# Patient Record
Sex: FEMALE | Race: WHITE | NOT HISPANIC OR LATINO | Employment: FULL TIME | URBAN - NONMETROPOLITAN AREA
[De-identification: names, ages, dates, MRNs, and addresses within clinical notes are randomized per-mention and may not be internally consistent; named-entity substitution may affect disease eponyms.]

---

## 2024-01-23 ENCOUNTER — APPOINTMENT (OUTPATIENT)
Dept: RADIOLOGY | Facility: HOSPITAL | Age: 34
End: 2024-01-23

## 2024-01-23 ENCOUNTER — HOSPITAL ENCOUNTER (EMERGENCY)
Facility: HOSPITAL | Age: 34
Discharge: HOME | End: 2024-01-23
Attending: EMERGENCY MEDICINE

## 2024-01-23 VITALS
HEART RATE: 58 BPM | HEIGHT: 62 IN | RESPIRATION RATE: 18 BRPM | SYSTOLIC BLOOD PRESSURE: 152 MMHG | WEIGHT: 280 LBS | BODY MASS INDEX: 51.53 KG/M2 | DIASTOLIC BLOOD PRESSURE: 72 MMHG | TEMPERATURE: 97.8 F | OXYGEN SATURATION: 95 %

## 2024-01-23 DIAGNOSIS — K92.1 BLOOD IN THE STOOL: Primary | ICD-10-CM

## 2024-01-23 LAB
ALBUMIN SERPL BCP-MCNC: 4 G/DL (ref 3.4–5)
ALP SERPL-CCNC: 67 U/L (ref 33–110)
ALT SERPL W P-5'-P-CCNC: 37 U/L (ref 7–45)
ANION GAP SERPL CALC-SCNC: 10 MMOL/L (ref 10–20)
APPEARANCE UR: CLEAR
AST SERPL W P-5'-P-CCNC: 24 U/L (ref 9–39)
BASOPHILS # BLD AUTO: 0.07 X10*3/UL (ref 0–0.1)
BASOPHILS NFR BLD AUTO: 0.4 %
BILIRUB DIRECT SERPL-MCNC: 0 MG/DL (ref 0–0.3)
BILIRUB SERPL-MCNC: 0.3 MG/DL (ref 0–1.2)
BILIRUB UR STRIP.AUTO-MCNC: NEGATIVE MG/DL
BUN SERPL-MCNC: 15 MG/DL (ref 6–23)
CALCIUM SERPL-MCNC: 9.4 MG/DL (ref 8.6–10.3)
CHLORIDE SERPL-SCNC: 106 MMOL/L (ref 98–107)
CO2 SERPL-SCNC: 27 MMOL/L (ref 21–32)
COLOR UR: YELLOW
CREAT SERPL-MCNC: 0.82 MG/DL (ref 0.5–1.05)
CRP SERPL-MCNC: 1.17 MG/DL
EGFRCR SERPLBLD CKD-EPI 2021: >90 ML/MIN/1.73M*2
EOSINOPHIL # BLD AUTO: 0.17 X10*3/UL (ref 0–0.7)
EOSINOPHIL NFR BLD AUTO: 1 %
ERYTHROCYTE [DISTWIDTH] IN BLOOD BY AUTOMATED COUNT: 14.9 % (ref 11.5–14.5)
ERYTHROCYTE [SEDIMENTATION RATE] IN BLOOD BY WESTERGREN METHOD: 31 MM/H (ref 0–20)
GIANT PLATELETS BLD QL SMEAR: NORMAL
GLUCOSE SERPL-MCNC: 121 MG/DL (ref 74–99)
GLUCOSE UR STRIP.AUTO-MCNC: NEGATIVE MG/DL
HCG UR QL IA.RAPID: NEGATIVE
HCT VFR BLD AUTO: 45.9 % (ref 36–46)
HEMOCCULT SP1 STL QL: NEGATIVE
HGB BLD-MCNC: 15.1 G/DL (ref 12–16)
HOLD SPECIMEN: NORMAL
HOLD SPECIMEN: NORMAL
IMM GRANULOCYTES # BLD AUTO: 0.05 X10*3/UL (ref 0–0.7)
IMM GRANULOCYTES NFR BLD AUTO: 0.3 % (ref 0–0.9)
KETONES UR STRIP.AUTO-MCNC: NEGATIVE MG/DL
LACTATE SERPL-SCNC: 1.5 MMOL/L (ref 0.4–2)
LEUKOCYTE ESTERASE UR QL STRIP.AUTO: NEGATIVE
LIPASE SERPL-CCNC: 32 U/L (ref 9–82)
LYMPHOCYTES # BLD AUTO: 5.91 X10*3/UL (ref 1.2–4.8)
LYMPHOCYTES NFR BLD AUTO: 33.9 %
MAGNESIUM SERPL-MCNC: 1.77 MG/DL (ref 1.6–2.4)
MCH RBC QN AUTO: 29.8 PG (ref 26–34)
MCHC RBC AUTO-ENTMCNC: 32.9 G/DL (ref 32–36)
MCV RBC AUTO: 91 FL (ref 80–100)
MONOCYTES # BLD AUTO: 1.16 X10*3/UL (ref 0.1–1)
MONOCYTES NFR BLD AUTO: 6.6 %
NEUTROPHILS # BLD AUTO: 10.09 X10*3/UL (ref 1.2–7.7)
NEUTROPHILS NFR BLD AUTO: 57.8 %
NITRITE UR QL STRIP.AUTO: NEGATIVE
NRBC BLD-RTO: 0 /100 WBCS (ref 0–0)
PH UR STRIP.AUTO: 6 [PH]
PLATELET # BLD AUTO: 367 X10*3/UL (ref 150–450)
POTASSIUM SERPL-SCNC: 3.9 MMOL/L (ref 3.5–5.3)
PROT SERPL-MCNC: 7.1 G/DL (ref 6.4–8.2)
PROT UR STRIP.AUTO-MCNC: NEGATIVE MG/DL
RBC # BLD AUTO: 5.07 X10*6/UL (ref 4–5.2)
RBC # UR STRIP.AUTO: ABNORMAL /UL
RBC #/AREA URNS AUTO: NORMAL /HPF
RBC MORPH BLD: NORMAL
SODIUM SERPL-SCNC: 139 MMOL/L (ref 136–145)
SP GR UR STRIP.AUTO: 1.02
SQUAMOUS #/AREA URNS AUTO: NORMAL /HPF
UROBILINOGEN UR STRIP.AUTO-MCNC: <2 MG/DL
WBC # BLD AUTO: 17.5 X10*3/UL (ref 4.4–11.3)
WBC #/AREA URNS AUTO: NORMAL /HPF

## 2024-01-23 PROCEDURE — 36415 COLL VENOUS BLD VENIPUNCTURE: CPT | Performed by: EMERGENCY MEDICINE

## 2024-01-23 PROCEDURE — 99284 EMERGENCY DEPT VISIT MOD MDM: CPT | Mod: 25

## 2024-01-23 PROCEDURE — 83690 ASSAY OF LIPASE: CPT | Performed by: EMERGENCY MEDICINE

## 2024-01-23 PROCEDURE — 81001 URINALYSIS AUTO W/SCOPE: CPT | Performed by: EMERGENCY MEDICINE

## 2024-01-23 PROCEDURE — 82248 BILIRUBIN DIRECT: CPT | Performed by: EMERGENCY MEDICINE

## 2024-01-23 PROCEDURE — 85025 COMPLETE CBC W/AUTO DIFF WBC: CPT | Performed by: EMERGENCY MEDICINE

## 2024-01-23 PROCEDURE — 81025 URINE PREGNANCY TEST: CPT | Performed by: EMERGENCY MEDICINE

## 2024-01-23 PROCEDURE — 86140 C-REACTIVE PROTEIN: CPT | Performed by: EMERGENCY MEDICINE

## 2024-01-23 PROCEDURE — 85652 RBC SED RATE AUTOMATED: CPT | Performed by: EMERGENCY MEDICINE

## 2024-01-23 PROCEDURE — 2550000001 HC RX 255 CONTRASTS: Performed by: EMERGENCY MEDICINE

## 2024-01-23 PROCEDURE — 74177 CT ABD & PELVIS W/CONTRAST: CPT | Performed by: RADIOLOGY

## 2024-01-23 PROCEDURE — 83735 ASSAY OF MAGNESIUM: CPT | Performed by: EMERGENCY MEDICINE

## 2024-01-23 PROCEDURE — 82270 OCCULT BLOOD FECES: CPT | Performed by: EMERGENCY MEDICINE

## 2024-01-23 PROCEDURE — 74177 CT ABD & PELVIS W/CONTRAST: CPT

## 2024-01-23 PROCEDURE — 80048 BASIC METABOLIC PNL TOTAL CA: CPT | Performed by: EMERGENCY MEDICINE

## 2024-01-23 PROCEDURE — 99284 EMERGENCY DEPT VISIT MOD MDM: CPT | Performed by: EMERGENCY MEDICINE

## 2024-01-23 PROCEDURE — 83605 ASSAY OF LACTIC ACID: CPT | Performed by: EMERGENCY MEDICINE

## 2024-01-23 RX ADMIN — IOHEXOL 75 ML: 350 INJECTION, SOLUTION INTRAVENOUS at 14:00

## 2024-01-23 ASSESSMENT — COLUMBIA-SUICIDE SEVERITY RATING SCALE - C-SSRS
2. HAVE YOU ACTUALLY HAD ANY THOUGHTS OF KILLING YOURSELF?: NO
6. HAVE YOU EVER DONE ANYTHING, STARTED TO DO ANYTHING, OR PREPARED TO DO ANYTHING TO END YOUR LIFE?: NO
6. HAVE YOU EVER DONE ANYTHING, STARTED TO DO ANYTHING, OR PREPARED TO DO ANYTHING TO END YOUR LIFE?: NO
2. HAVE YOU ACTUALLY HAD ANY THOUGHTS OF KILLING YOURSELF?: NO
1. IN THE PAST MONTH, HAVE YOU WISHED YOU WERE DEAD OR WISHED YOU COULD GO TO SLEEP AND NOT WAKE UP?: NO
1. IN THE PAST MONTH, HAVE YOU WISHED YOU WERE DEAD OR WISHED YOU COULD GO TO SLEEP AND NOT WAKE UP?: NO

## 2024-01-23 ASSESSMENT — ENCOUNTER SYMPTOMS
SEIZURES: 0
SHORTNESS OF BREATH: 0
COLOR CHANGE: 0
BLOOD IN STOOL: 1
ABDOMINAL PAIN: 1
ARTHRALGIAS: 0
FEVER: 0
SORE THROAT: 0
COUGH: 0
DYSURIA: 0
HEMATURIA: 0
EYE PAIN: 0
VOMITING: 0
BACK PAIN: 0
PALPITATIONS: 0
CHILLS: 0

## 2024-01-23 ASSESSMENT — PAIN - FUNCTIONAL ASSESSMENT: PAIN_FUNCTIONAL_ASSESSMENT: 0-10

## 2024-01-23 ASSESSMENT — PAIN SCALES - GENERAL: PAINLEVEL_OUTOF10: 0 - NO PAIN

## 2024-01-23 NOTE — ED PROVIDER NOTES
History:  Chief Complaint   Patient presents with    Rectal Bleeding     Pt has had rectal bleeding with bMs for last 3  weeks     HPI    History of Present Illness:  HPI    History of Present Illness:    Patient information was obtained from: Patient  History/exam Limitations: None  Patient resented to the Emergency Department: Private vehicle    Chief Complaint: Intermittent rectal bleeding    HPI:  Analy Dumont, 33 y.o. female presents today with: Patient presents with complaint of intermittent episodes of rectal bleeding over the last 3 weeks.  Last over the last couple days.  Describes it as some blood after having bowel movements.  History of hemorrhoids.  Complains of some lower abdominal cramping.  No nausea or vomiting.  No other systemic complaints.  Symptoms are mild and intermittent.    Past Medical History:    Past Medical History:   Diagnosis Date    Abnormal findings on diagnostic imaging of other parts of digestive tract 2016    Abnormal CT scan, gastrointestinal tract    Bipolar disorder, unspecified (CMS/HCC)     Bipolar 1 disorder, depressed    Calculus of gallbladder with chronic cholecystitis without obstruction 2016    Calculus of gallbladder with chronic cholecystitis without obstruction    Chronic obstructive pulmonary disease, unspecified (CMS/HCC)     Chronic bronchiolitis    Hemorrhage of anus and rectum 2016    Rectal bleed    Personal history of other diseases of the respiratory system     History of asthma    Personal history of other mental and behavioral disorders     History of attention deficit hyperactivity disorder (ADHD)    Unspecified injury of left lower leg, initial encounter     Injury of knee, left       Past Surgical History:   Procedure Laterality Date     SECTION, LOW TRANSVERSE      CHOLECYSTECTOMY  2016    Cholecystectomy Laparoscopic    KNEE SURGERY  2016    Knee Surgery    OTHER SURGICAL HISTORY  2016    Colonoscopy  "(Fiberoptic) Forceps Biopsy       No family history on file.    Allergies   Allergen Reactions    Diclofenac GI bleeding and Unknown     Bloody stools    Latex Itching    Vilazodone Unknown    Honey Rash    Hydromorphone Rash     Pt reported redness in the face.       Social History     Tobacco Use    Smoking status: Every Day     Packs/day: .5     Types: Cigarettes    Smokeless tobacco: Never   Substance Use Topics    Drug use: Never     Past medical, surgical, social, and family history that was noted in the nursing note was also reviewed.    ROS:  Review of Systems   Constitutional:  Negative for chills and fever.   HENT:  Negative for ear pain and sore throat.    Eyes:  Negative for pain and visual disturbance.   Respiratory:  Negative for cough and shortness of breath.    Cardiovascular:  Negative for chest pain and palpitations.   Gastrointestinal:  Positive for abdominal pain and blood in stool. Negative for vomiting.   Genitourinary:  Negative for dysuria and hematuria.   Musculoskeletal:  Negative for arthralgias and back pain.   Skin:  Negative for color change and rash.   Neurological:  Negative for seizures and syncope.   All other systems reviewed and are negative.      Physical Exam:  ED Triage Vitals [01/23/24 1255]   Temperature Heart Rate Respirations BP   36.6 °C (97.8 °F) 60 18 (!) 161/100      SpO2 Temp Source Heart Rate Source Patient Position   95 % Tympanic -- --      BP Location FiO2 (%)     -- --       Vitals: Blood pressure (!) 161/100, pulse 60, temperature 36.6 °C (97.8 °F), temperature source Tympanic, resp. rate 18, height 1.575 m (5' 2\"), weight 127 kg (280 lb), last menstrual period 01/06/2024, SpO2 95 %.  Vitals:    01/23/24 1255   BP: (!) 161/100   Pulse: 60   Resp: 18   Temp: 36.6 °C (97.8 °F)   TempSrc: Tympanic   SpO2: 95%   Weight: 127 kg (280 lb)   Height: 1.575 m (5' 2\")       Physical Exam  Vitals and nursing note reviewed.   Constitutional:       General: She is not in acute " distress.     Appearance: She is well-developed.   HENT:      Head: Normocephalic and atraumatic.   Eyes:      Conjunctiva/sclera: Conjunctivae normal.   Cardiovascular:      Rate and Rhythm: Normal rate and regular rhythm.      Heart sounds: No murmur heard.  Pulmonary:      Effort: Pulmonary effort is normal. No respiratory distress.      Breath sounds: Normal breath sounds.   Abdominal:      Palpations: Abdomen is soft.      Tenderness: There is no abdominal tenderness.   Genitourinary:     Rectum: Guaiac result negative.   Musculoskeletal:         General: No swelling.      Cervical back: Neck supple.   Skin:     General: Skin is warm and dry.      Capillary Refill: Capillary refill takes less than 2 seconds.   Neurological:      Mental Status: She is alert.   Psychiatric:         Mood and Affect: Mood normal.         ED Course:  Diagnostic test reviewed:  ED Course as of 01/23/24 1435   Tue Jan 23, 2024   1329 Occult Blood, Stool  Negative [SD]   1329 hCG, Urine, Qualitative  Negative [SD]   1330 CBC and Auto Differential(!)  Leukocytosis of 17.5. [SD]   1352 Hepatic function panel  LFT normal. [SD]   1352 Lipase  Lipase normal [SD]   1352 Basic metabolic panel(!)  BMP unremarkable. [SD]   1352 Lactate  Lactate negative. [SD]   1352 Magnesium  Magnesium normal. [SD]   1352 C-Reactive Protein(!)  Mild elevation. [SD]   1353 Sedimentation Rate(!)  Mild elevation. [SD]   1353 Urinalysis with Reflex Microscopic(!)  Not significant for UTI. [SD]   1435 CT abdomen pelvis w IV contrast  ED physician interpretation: No obvious bowel obstruction.  No obvious diverticulitis. [SD]      ED Course User Index  [SD] Esteban Osorio DO         Diagnoses as of 01/23/24 1435   Blood in the stool                 Administrated medications:  Medications   iohexol (OMNIPaque) 350 mg iodine/mL solution 75 mL (75 mL intravenous Given 1/23/24 1400)       New Prescriptions    No medications on file       Test ordered and  reviewed:  Labs Reviewed   CBC WITH AUTO DIFFERENTIAL - Abnormal       Result Value    WBC 17.5 (*)     nRBC 0.0      RBC 5.07      Hemoglobin 15.1      Hematocrit 45.9      MCV 91      MCH 29.8      MCHC 32.9      RDW 14.9 (*)     Platelets 367      Neutrophils % 57.8      Immature Granulocytes %, Automated 0.3      Lymphocytes % 33.9      Monocytes % 6.6      Eosinophils % 1.0      Basophils % 0.4      Neutrophils Absolute 10.09 (*)     Immature Granulocytes Absolute, Automated 0.05      Lymphocytes Absolute 5.91 (*)     Monocytes Absolute 1.16 (*)     Eosinophils Absolute 0.17      Basophils Absolute 0.07     BASIC METABOLIC PANEL - Abnormal    Glucose 121 (*)     Sodium 139      Potassium 3.9      Chloride 106      Bicarbonate 27      Anion Gap 10      Urea Nitrogen 15      Creatinine 0.82      eGFR >90      Calcium 9.4     C-REACTIVE PROTEIN - Abnormal    C-Reactive Protein 1.17 (*)    SEDIMENTATION RATE, AUTOMATED - Abnormal    Sedimentation Rate 31 (*)    URINALYSIS WITH REFLEX MICROSCOPIC - Abnormal    Color, Urine Yellow      Appearance, Urine Clear      Specific Gravity, Urine 1.016      pH, Urine 6.0      Protein, Urine NEGATIVE      Glucose, Urine NEGATIVE      Blood, Urine SMALL (1+) (*)     Ketones, Urine NEGATIVE      Bilirubin, Urine NEGATIVE      Urobilinogen, Urine <2.0      Nitrite, Urine NEGATIVE      Leukocyte Esterase, Urine NEGATIVE     OCCULT BLOOD X1, STOOL - Normal    Occult Blood, Stool X1 Negative     MAGNESIUM - Normal    Magnesium 1.77     LIPASE - Normal    Lipase 32      Narrative:     Venipuncture immediately after or during the administration of Metamizole may lead to falsely low results. Testing should be performed immediately prior to Metamizole dosing.   HEPATIC FUNCTION PANEL - Normal    Albumin 4.0      Bilirubin, Total 0.3      Bilirubin, Direct 0.0      Alkaline Phosphatase 67      ALT 37      AST 24      Total Protein 7.1     LACTATE - Normal    Lactate 1.5      Narrative:      Venipuncture immediately after or during the administration of Metamizole may lead to falsely low results. Testing should be performed immediately  prior to Metamizole dosing.   HCG, URINE, QUALITATIVE - Normal    HCG, Urine NEGATIVE     MICROSCOPIC ONLY, URINE    WBC, Urine 1-5      RBC, Urine 3-5      Squamous Epithelial Cells, Urine 1-9 (SPARSE)     MORPHOLOGY    RBC Morphology No significant RBC morphology present      Giant Platelets Few         CT abdomen pelvis w IV contrast   Final Result   Trace pelvic free fluid likely physiologic. Otherwise no acute   findings of the abdomen or pelvis.        MACRO:   None.        Signed by: Lalo Luke 1/23/2024 2:20 PM   Dictation workstation:   [id number]          ED Impression:  1. Blood in the stool            Critical care time: 0 (Zero) minutes.    Medical Decision Making  Patient stable.  CT negative for acute intra or abdominal pathology.  No sign of diverticulitis or obstruction.  Guaiac stool was negative here.  Did get a good sample on rectal exam.  Labs reassuring.  She has a chronically elevated white blood cell count, do not believe that is related to the rectal bleeding.  No sign of UTI.  She is not pregnant.  I think she safe for discharge.  Recommend follow-up with a GI doctor.  I will discharge the patient home.  Discussed the results of the exam and/or testing.  Written instructions provided.  Discussed return reasons, patient verbalizes understanding.  Stress follow-up with PCP.    Problems Addressed:  Blood in the stool: acute illness or injury    Amount and/or Complexity of Data Reviewed  External Data Reviewed: labs, radiology and notes.     Details: 10/19/2022 PCP Note reviewed.  Labs: ordered. Decision-making details documented in ED Course.  Radiology: ordered and independent interpretation performed. Decision-making details documented in ED Course.        Diagnosis Ruled In: See clinical impression above.  Diagnoses Ruled Out:  Appendicitis, Small bowel obstruction, Diverticulitis, Acute cholecystitis, Pancreatitis, and Urinary tract infection    History Obtained From: Patient    Test interpretations: See ED course if present.    Consideration for tests/treatments/admission not undertaken: None    Social Determinants of Health: None    MDM  Reviewed: vitals, nursing note and previous chart  Reviewed previous: labs  Interpretation: labs and CT scan        No follow-ups on file.    Esteban Hernandez DO  1/23/2024  Attending Emergency Physician    Clinician of Record Attestation: I, Dr. Esteban Hernandez DO, am the primary clinician of record.    Please note this report has been produced using speech recognition software, and may contain errors related to that system - Including errors in grammar, punctuation, and spelling, as well as words and phrases that may be inappropriate. If there are any questions or concerns, please contact the dictating physician/physician assistant for clarification.                 Esteban Hernandez DO  01/23/24 3767

## 2024-02-16 ENCOUNTER — APPOINTMENT (OUTPATIENT)
Dept: RADIOLOGY | Facility: HOSPITAL | Age: 34
End: 2024-02-16

## 2024-02-16 ENCOUNTER — HOSPITAL ENCOUNTER (EMERGENCY)
Facility: HOSPITAL | Age: 34
Discharge: HOME | End: 2024-02-16
Attending: EMERGENCY MEDICINE

## 2024-02-16 VITALS
WEIGHT: 270 LBS | HEIGHT: 62 IN | HEART RATE: 81 BPM | RESPIRATION RATE: 20 BRPM | TEMPERATURE: 98.5 F | DIASTOLIC BLOOD PRESSURE: 68 MMHG | BODY MASS INDEX: 49.69 KG/M2 | OXYGEN SATURATION: 94 % | SYSTOLIC BLOOD PRESSURE: 151 MMHG

## 2024-02-16 DIAGNOSIS — J98.01 BRONCHOSPASM: ICD-10-CM

## 2024-02-16 DIAGNOSIS — J11.1 FLU: Primary | ICD-10-CM

## 2024-02-16 LAB
FLUAV RNA RESP QL NAA+PROBE: NOT DETECTED
FLUBV RNA RESP QL NAA+PROBE: DETECTED
S PYO DNA THROAT QL NAA+PROBE: NOT DETECTED
SARS-COV-2 RNA RESP QL NAA+PROBE: NOT DETECTED

## 2024-02-16 PROCEDURE — 71045 X-RAY EXAM CHEST 1 VIEW: CPT

## 2024-02-16 PROCEDURE — 87636 SARSCOV2 & INF A&B AMP PRB: CPT | Performed by: EMERGENCY MEDICINE

## 2024-02-16 PROCEDURE — 94664 DEMO&/EVAL PT USE INHALER: CPT

## 2024-02-16 PROCEDURE — 9420000001 HC RT PATIENT EDUCATION 5 MIN

## 2024-02-16 PROCEDURE — 2500000004 HC RX 250 GENERAL PHARMACY W/ HCPCS (ALT 636 FOR OP/ED)

## 2024-02-16 PROCEDURE — 71045 X-RAY EXAM CHEST 1 VIEW: CPT | Performed by: STUDENT IN AN ORGANIZED HEALTH CARE EDUCATION/TRAINING PROGRAM

## 2024-02-16 PROCEDURE — 99285 EMERGENCY DEPT VISIT HI MDM: CPT | Mod: 25 | Performed by: EMERGENCY MEDICINE

## 2024-02-16 PROCEDURE — 2500000002 HC RX 250 W HCPCS SELF ADMINISTERED DRUGS (ALT 637 FOR MEDICARE OP, ALT 636 FOR OP/ED): Performed by: EMERGENCY MEDICINE

## 2024-02-16 PROCEDURE — 99283 EMERGENCY DEPT VISIT LOW MDM: CPT | Mod: 25

## 2024-02-16 PROCEDURE — 94640 AIRWAY INHALATION TREATMENT: CPT

## 2024-02-16 PROCEDURE — 87651 STREP A DNA AMP PROBE: CPT | Performed by: EMERGENCY MEDICINE

## 2024-02-16 RX ORDER — ALBUTEROL SULFATE 90 UG/1
1-2 AEROSOL, METERED RESPIRATORY (INHALATION) EVERY 6 HOURS PRN
Qty: 18 G | Refills: 0 | Status: SHIPPED | OUTPATIENT
Start: 2024-02-16 | End: 2024-03-17

## 2024-02-16 RX ORDER — PREDNISONE 20 MG/1
TABLET ORAL
Status: COMPLETED
Start: 2024-02-16 | End: 2024-02-16

## 2024-02-16 RX ORDER — IPRATROPIUM BROMIDE AND ALBUTEROL SULFATE 2.5; .5 MG/3ML; MG/3ML
3 SOLUTION RESPIRATORY (INHALATION)
Status: DISCONTINUED | OUTPATIENT
Start: 2024-02-16 | End: 2024-02-17 | Stop reason: HOSPADM

## 2024-02-16 RX ORDER — PREDNISONE 20 MG/1
60 TABLET ORAL ONCE
Status: COMPLETED | OUTPATIENT
Start: 2024-02-16 | End: 2024-02-16

## 2024-02-16 RX ORDER — OSELTAMIVIR PHOSPHATE 75 MG/1
75 CAPSULE ORAL EVERY 12 HOURS
Qty: 10 CAPSULE | Refills: 0 | Status: SHIPPED | OUTPATIENT
Start: 2024-02-16 | End: 2024-02-21

## 2024-02-16 RX ORDER — PREDNISONE 50 MG/1
50 TABLET ORAL DAILY
Qty: 5 TABLET | Refills: 0 | Status: SHIPPED | OUTPATIENT
Start: 2024-02-16 | End: 2024-02-21

## 2024-02-16 RX ORDER — ALBUTEROL SULFATE 0.83 MG/ML
5 SOLUTION RESPIRATORY (INHALATION) ONCE
Status: COMPLETED | OUTPATIENT
Start: 2024-02-16 | End: 2024-02-16

## 2024-02-16 RX ADMIN — PREDNISONE 60 MG: 20 TABLET ORAL at 22:00

## 2024-02-16 RX ADMIN — ALBUTEROL SULFATE 5 MG: 2.5 SOLUTION RESPIRATORY (INHALATION) at 21:15

## 2024-02-16 RX ADMIN — IPRATROPIUM BROMIDE AND ALBUTEROL SULFATE 3 ML: 2.5; .5 SOLUTION RESPIRATORY (INHALATION) at 21:15

## 2024-02-16 ASSESSMENT — COLUMBIA-SUICIDE SEVERITY RATING SCALE - C-SSRS
6. HAVE YOU EVER DONE ANYTHING, STARTED TO DO ANYTHING, OR PREPARED TO DO ANYTHING TO END YOUR LIFE?: NO
2. HAVE YOU ACTUALLY HAD ANY THOUGHTS OF KILLING YOURSELF?: NO
1. IN THE PAST MONTH, HAVE YOU WISHED YOU WERE DEAD OR WISHED YOU COULD GO TO SLEEP AND NOT WAKE UP?: NO

## 2024-02-16 ASSESSMENT — PAIN SCALES - GENERAL: PAINLEVEL_OUTOF10: 7

## 2024-02-16 ASSESSMENT — PAIN DESCRIPTION - PAIN TYPE: TYPE: ACUTE PAIN

## 2024-02-16 ASSESSMENT — PAIN - FUNCTIONAL ASSESSMENT: PAIN_FUNCTIONAL_ASSESSMENT: 0-10

## 2024-02-17 NOTE — ED PROVIDER NOTES
HPI   Chief Complaint   Patient presents with    Flu Symptoms     Pt reports dry cough started 2 days ago and then started to have fatigue, chills, shortness of breath, sore throat, and loss of taste and smell.       33-year-old female presents emergency department complaining of cough and congestion shortness of breath.  Patient states symptoms started yesterday much worse today.  States that she suffers from seasonal asthma.  Denies any chest pain.  No lightheadedness.  She has been feeling feverish.  Has not taken anything to help with the symptoms.  Denies any other complaints.                          [place] Coma Scale Score: 15                     Patient History   Past Medical History:   Diagnosis Date    Abnormal findings on diagnostic imaging of other parts of digestive tract 2016    Abnormal CT scan, gastrointestinal tract    Bipolar disorder, unspecified (CMS/HCC)     Bipolar 1 disorder, depressed    Calculus of gallbladder with chronic cholecystitis without obstruction 2016    Calculus of gallbladder with chronic cholecystitis without obstruction    Chronic obstructive pulmonary disease, unspecified (CMS/HCC)     Chronic bronchiolitis    Hemorrhage of anus and rectum 2016    Rectal bleed    Personal history of other diseases of the respiratory system     History of asthma    Personal history of other mental and behavioral disorders     History of attention deficit hyperactivity disorder (ADHD)    Unspecified injury of left lower leg, initial encounter     Injury of knee, left     Past Surgical History:   Procedure Laterality Date     SECTION, LOW TRANSVERSE      CHOLECYSTECTOMY  2016    Cholecystectomy Laparoscopic    KNEE SURGERY  2016    Knee Surgery    OTHER SURGICAL HISTORY  2016    Colonoscopy (Fiberoptic) Forceps Biopsy     No family history on file.  Social History     Tobacco Use    Smoking status: Every Day     Packs/day: .5     Types: Cigarettes     Smokeless tobacco: Never   Substance Use Topics    Alcohol use: Not on file    Drug use: Never       Physical Exam   ED Triage Vitals [02/16/24 2023]   Temperature Heart Rate Respirations BP   36.9 °C (98.5 °F) 74 20 160/85      SpO2 Temp Source Heart Rate Source Patient Position   95 % Oral Monitor Sitting      BP Location FiO2 (%)     Right arm --       Physical Exam  Constitutional:       Appearance: Normal appearance.   HENT:      Head: Normocephalic and atraumatic.      Mouth/Throat:      Mouth: Mucous membranes are moist.      Comments: Posterior pharyngeal erythema with cherry-red tonsils  Eyes:      Extraocular Movements: Extraocular movements intact.      Pupils: Pupils are equal, round, and reactive to light.   Cardiovascular:      Rate and Rhythm: Normal rate and regular rhythm.   Pulmonary:      Effort: Pulmonary effort is normal.      Comments: Lungs reveal diffuse expiratory wheezes without rales.  Few scattered rhonchi.  Abdominal:      Palpations: Abdomen is soft.      Tenderness: There is no abdominal tenderness.   Musculoskeletal:         General: Normal range of motion.   Skin:     General: Skin is warm and dry.   Neurological:      Mental Status: She is alert and oriented to person, place, and time.   Psychiatric:         Behavior: Behavior normal.         ED Course & MDM   Diagnoses as of 02/16/24 2205   Flu   Bronchospasm       Medical Decision Making  33-year-old female presents emergency department with congestion, cough and shortness of breath.  Differential includes pneumonia, bronchitis, flu, RSV.  History not suggestive of acute coronary syndrome, congestive heart failure or pulmonary embolism.  Flu B is positive.  Patient with symptoms for 2 days.  She will be discharged with prescription for Tamiflu.  She had bronchospasm on initial examination and has been treated with breathing treatments and lungs cleared.  She has a history of asthma.  She will be discharged with prescription for  albuterol and prednisone.  Patient had a chest x-ray which I reviewed which reveals no evidence of pneumonia, pneumothorax or congestive vascular changes.    Amount and/or Complexity of Data Reviewed  Labs:  Decision-making details documented in ED Course.      Labs Reviewed   SARS-COV-2 AND INFLUENZA A/B PCR - Abnormal       Result Value    Flu A Result Not Detected      Flu B Result Detected (*)     Coronavirus 2019, PCR Not Detected      Narrative:     This assay has received FDA Emergency Use Authorization (EUA) and  is only authorized for the duration of time that circumstances exist to justify the authorization of the emergency use of in vitro diagnostic tests for the detection of SARS-CoV-2 virus and/or diagnosis of COVID-19 infection under section 564(b)(1) of the Act, 21 U.S.C. 360bbb-3(b)(1). Testing for SARS-CoV-2 is only recommended for patients who meet current clinical and/or epidemiological criteria as defined by federal, state, or local public health directives. This assay is an in vitro diagnostic nucleic acid amplification test for the qualitative detection of SARS-CoV-2, Influenza A, and Influenza B from nasopharyngeal specimens and has been validated for use at [facility]. Negative results do not preclude COVID-19 infections or Influenza A/B infections, and should not be used as the sole basis for diagnosis, treatment, or other management decisions. If Influenza A/B and RSV PCR results are negative, testing for Parainfluenza virus, Adenovirus and Metapneumovirus is routinely performed for [facility], and is available on other patients by placing an add-on request.    GROUP A STREPTOCOCCUS, PCR - Normal    Group A Strep PCR Not Detected         Procedure  Procedures     Fortunato Cervantes MD  02/16/24 2203       Fortunato Cervantes MD  02/16/24 2204

## 2024-03-23 ENCOUNTER — HOSPITAL ENCOUNTER (EMERGENCY)
Facility: HOSPITAL | Age: 34
Discharge: HOME | End: 2024-03-23
Attending: FAMILY MEDICINE

## 2024-03-23 ENCOUNTER — APPOINTMENT (OUTPATIENT)
Dept: RADIOLOGY | Facility: HOSPITAL | Age: 34
End: 2024-03-23

## 2024-03-23 VITALS
WEIGHT: 277 LBS | TEMPERATURE: 97.1 F | RESPIRATION RATE: 18 BRPM | HEART RATE: 84 BPM | SYSTOLIC BLOOD PRESSURE: 174 MMHG | DIASTOLIC BLOOD PRESSURE: 94 MMHG | BODY MASS INDEX: 50.97 KG/M2 | OXYGEN SATURATION: 99 % | HEIGHT: 62 IN

## 2024-03-23 DIAGNOSIS — I15.9 SECONDARY HYPERTENSION: ICD-10-CM

## 2024-03-23 DIAGNOSIS — R05.1 ACUTE COUGH: Primary | ICD-10-CM

## 2024-03-23 PROCEDURE — 71045 X-RAY EXAM CHEST 1 VIEW: CPT | Performed by: RADIOLOGY

## 2024-03-23 PROCEDURE — 71045 X-RAY EXAM CHEST 1 VIEW: CPT

## 2024-03-23 PROCEDURE — 99283 EMERGENCY DEPT VISIT LOW MDM: CPT | Mod: 25

## 2024-03-23 ASSESSMENT — PAIN DESCRIPTION - PAIN TYPE: TYPE: ACUTE PAIN

## 2024-03-23 ASSESSMENT — PAIN - FUNCTIONAL ASSESSMENT: PAIN_FUNCTIONAL_ASSESSMENT: 0-10

## 2024-03-23 ASSESSMENT — COLUMBIA-SUICIDE SEVERITY RATING SCALE - C-SSRS
2. HAVE YOU ACTUALLY HAD ANY THOUGHTS OF KILLING YOURSELF?: NO
6. HAVE YOU EVER DONE ANYTHING, STARTED TO DO ANYTHING, OR PREPARED TO DO ANYTHING TO END YOUR LIFE?: NO
1. IN THE PAST MONTH, HAVE YOU WISHED YOU WERE DEAD OR WISHED YOU COULD GO TO SLEEP AND NOT WAKE UP?: NO

## 2024-03-23 ASSESSMENT — PAIN SCALES - GENERAL: PAINLEVEL_OUTOF10: 5 - MODERATE PAIN

## 2024-03-23 NOTE — ED PROVIDER NOTES
HPI   Chief Complaint   Patient presents with    Numbness     Area to left thigh/hip intermittently    Cough       33-year-old female comes ED with complaint of persistent nasal congestion and cough for the last several days.  Patient reports taking nothing to treat the symptoms thus far and comes in today to have it evaluated.  Patient also reports an intermittent sensation of tingling across the lateral part of her thigh and a small does need quarter sized dissection that comes and goes.  Patient currently does not have that sensation.  Patient reports no other associated complaints or concerns.  Patient in the ED is alert, cooperative, appears comfortable, and in no distress.  Patient, denies headache, neck pain, chest pain, back pain, abdominal pain, shortness of breath, fevers, falls, recent travel, sick contacts, loss sensation, ataxia, dizziness, and weakness.      History provided by:  Patient and medical records   used: No                        [place] Coma Scale Score: 15                     Patient History   Past Medical History:   Diagnosis Date    Abnormal findings on diagnostic imaging of other parts of digestive tract 04/13/2016    Abnormal CT scan, gastrointestinal tract    Bipolar disorder, unspecified (CMS/[facility])     Bipolar 1 disorder, depressed    Calculus of gallbladder with chronic cholecystitis without obstruction 02/09/2016    Calculus of gallbladder with chronic cholecystitis without obstruction    Chronic obstructive pulmonary disease, unspecified (CMS/[facility])     Chronic bronchiolitis    Hemorrhage of anus and rectum 04/12/2016    Rectal bleed    Personal history of other diseases of the respiratory system     History of asthma    Personal history of other mental and behavioral disorders     History of attention deficit hyperactivity disorder (ADHD)    Unspecified injury of left lower leg, initial encounter     Injury of knee, left     Past Surgical History:   Procedure  Laterality Date     SECTION, LOW TRANSVERSE      CHOLECYSTECTOMY  2016    Cholecystectomy Laparoscopic    KNEE SURGERY  2016    Knee Surgery    OTHER SURGICAL HISTORY  2016    Colonoscopy (Fiberoptic) Forceps Biopsy     No family history on file.  Social History     Tobacco Use    Smoking status: Every Day     Packs/day: .5     Types: Cigarettes    Smokeless tobacco: Never   Substance Use Topics    Alcohol use: Not on file    Drug use: Never       Physical Exam   ED Triage Vitals [24 1526]   Temperature Heart Rate Respirations BP   36.2 °C (97.1 °F) 78 16 (!) 201/102      SpO2 Temp Source Heart Rate Source Patient Position   98 % Temporal Monitor --      BP Location FiO2 (%)     -- --       Physical Exam  Vitals and nursing note reviewed.   Constitutional:       General: She is not in acute distress.     Appearance: She is well-developed.   HENT:      Head: Normocephalic and atraumatic.      Nose: Mucosal edema and congestion present.      Mouth/Throat:      Lips: Pink.      Mouth: Mucous membranes are moist.      Pharynx: Oropharynx is clear. Uvula midline.   Eyes:      Conjunctiva/sclera: Conjunctivae normal.   Cardiovascular:      Rate and Rhythm: Normal rate and regular rhythm.      Pulses: Normal pulses.      Heart sounds: Normal heart sounds, S1 normal and S2 normal. No murmur heard.  Pulmonary:      Effort: Pulmonary effort is normal. No tachypnea or respiratory distress.      Breath sounds: Normal breath sounds and air entry.   Abdominal:      Palpations: Abdomen is soft.      Tenderness: There is no abdominal tenderness.   Musculoskeletal:         General: No swelling.      Cervical back: Neck supple.   Skin:     General: Skin is warm and dry.      Capillary Refill: Capillary refill takes less than 2 seconds.   Neurological:      General: No focal deficit present.      Mental Status: She is alert and oriented to person, place, and time.      GCS: GCS eye subscore is 4. GCS  verbal subscore is 5. GCS motor subscore is 6.      Cranial Nerves: Cranial nerves 2-12 are intact.      Sensory: Sensation is intact.      Motor: Motor function is intact.      Coordination: Coordination is intact.      Gait: Gait is intact.   Psychiatric:         Mood and Affect: Mood normal.         ED Course & MDM   Diagnoses as of 03/23/24 1643   Acute cough   Secondary hypertension     XR chest 1 view   Final Result   No active cardiopulmonary disease.                  MACRO:   None        Signed by: Malu Saunders 3/23/2024 4:18 PM   Dictation workstation:   [id number]          Medical Decision Making  Patient upon arrival to the ED appeared to be comfortable and in no distress with stable vital signs and elevated blood pressure.  Discussed with patient presenting complaints and clinical findings.  Reviewed with patient her epic chart and counseled patient on nasal congestion/cough and appropriate approach to management/treatments.  After assessment and evaluation imaging was ordered and further conversation was had with patient regarding management and monitoring of her blood pressure.  After period of time patient was revealed continue to be unchanged and has no new physical complaints.  Imaging results were reviewed/discussed with patient and at this time she is advised to contact her primary care doctor follow-up recheck in several days of her complaints.  Patient also educated use of over-the-counter medication for management of her symptoms.  Patient stable and discharged home.    Amount and/or Complexity of Data Reviewed  External Data Reviewed: labs, radiology and notes.  Radiology: ordered. Decision-making details documented in ED Course.    Risk  OTC drugs.        Procedure  Procedures     Jose David Brown MD  03/23/24 [id number]

## 2024-04-01 ENCOUNTER — APPOINTMENT (OUTPATIENT)
Dept: RADIOLOGY | Facility: HOSPITAL | Age: 34
End: 2024-04-01

## 2024-04-01 ENCOUNTER — APPOINTMENT (OUTPATIENT)
Dept: CARDIOLOGY | Facility: HOSPITAL | Age: 34
End: 2024-04-01

## 2024-04-01 ENCOUNTER — HOSPITAL ENCOUNTER (EMERGENCY)
Facility: HOSPITAL | Age: 34
Discharge: HOME | End: 2024-04-01
Attending: EMERGENCY MEDICINE

## 2024-04-01 VITALS
SYSTOLIC BLOOD PRESSURE: 111 MMHG | WEIGHT: 275.57 LBS | HEART RATE: 63 BPM | RESPIRATION RATE: 20 BRPM | TEMPERATURE: 98 F | BODY MASS INDEX: 50.71 KG/M2 | DIASTOLIC BLOOD PRESSURE: 64 MMHG | HEIGHT: 62 IN | OXYGEN SATURATION: 93 %

## 2024-04-01 DIAGNOSIS — R42 LIGHTHEADED: ICD-10-CM

## 2024-04-01 DIAGNOSIS — R07.9 CHEST PAIN AT REST: Primary | ICD-10-CM

## 2024-04-01 LAB
ALBUMIN SERPL BCP-MCNC: 4.8 G/DL (ref 3.4–5)
ALP SERPL-CCNC: 84 U/L (ref 33–110)
ALT SERPL W P-5'-P-CCNC: 58 U/L (ref 7–45)
ANION GAP SERPL CALC-SCNC: 16 MMOL/L (ref 10–20)
AST SERPL W P-5'-P-CCNC: 33 U/L (ref 9–39)
B-HCG SERPL-ACNC: <2 MIU/ML
BASOPHILS # BLD AUTO: 0.1 X10*3/UL (ref 0–0.1)
BASOPHILS NFR BLD AUTO: 0.5 %
BILIRUB SERPL-MCNC: 0.6 MG/DL (ref 0–1.2)
BUN SERPL-MCNC: 25 MG/DL (ref 6–23)
CALCIUM SERPL-MCNC: 10.1 MG/DL (ref 8.6–10.3)
CARDIAC TROPONIN I PNL SERPL HS: 3 NG/L (ref 0–13)
CARDIAC TROPONIN I PNL SERPL HS: 4 NG/L (ref 0–13)
CHLORIDE SERPL-SCNC: 104 MMOL/L (ref 98–107)
CO2 SERPL-SCNC: 24 MMOL/L (ref 21–32)
CREAT SERPL-MCNC: 1.25 MG/DL (ref 0.5–1.05)
EGFRCR SERPLBLD CKD-EPI 2021: 58 ML/MIN/1.73M*2
EOSINOPHIL # BLD AUTO: 0.05 X10*3/UL (ref 0–0.7)
EOSINOPHIL NFR BLD AUTO: 0.3 %
ERYTHROCYTE [DISTWIDTH] IN BLOOD BY AUTOMATED COUNT: 14.2 % (ref 11.5–14.5)
GLUCOSE SERPL-MCNC: 125 MG/DL (ref 74–99)
HCT VFR BLD AUTO: 50.8 % (ref 36–46)
HGB BLD-MCNC: 16.8 G/DL (ref 12–16)
HOLD SPECIMEN: NORMAL
IMM GRANULOCYTES # BLD AUTO: 0.07 X10*3/UL (ref 0–0.7)
IMM GRANULOCYTES NFR BLD AUTO: 0.4 % (ref 0–0.9)
LYMPHOCYTES # BLD AUTO: 4.85 X10*3/UL (ref 1.2–4.8)
LYMPHOCYTES NFR BLD AUTO: 24.6 %
MAGNESIUM SERPL-MCNC: 1.75 MG/DL (ref 1.6–2.4)
MCH RBC QN AUTO: 29.6 PG (ref 26–34)
MCHC RBC AUTO-ENTMCNC: 33.1 G/DL (ref 32–36)
MCV RBC AUTO: 89 FL (ref 80–100)
MONOCYTES # BLD AUTO: 1.36 X10*3/UL (ref 0.1–1)
MONOCYTES NFR BLD AUTO: 6.9 %
NEUTROPHILS # BLD AUTO: 13.27 X10*3/UL (ref 1.2–7.7)
NEUTROPHILS NFR BLD AUTO: 67.3 %
NRBC BLD-RTO: 0 /100 WBCS (ref 0–0)
PLATELET # BLD AUTO: 368 X10*3/UL (ref 150–450)
POTASSIUM SERPL-SCNC: 3.9 MMOL/L (ref 3.5–5.3)
PROT SERPL-MCNC: 8 G/DL (ref 6.4–8.2)
RBC # BLD AUTO: 5.68 X10*6/UL (ref 4–5.2)
SODIUM SERPL-SCNC: 140 MMOL/L (ref 136–145)
WBC # BLD AUTO: 19.7 X10*3/UL (ref 4.4–11.3)

## 2024-04-01 PROCEDURE — 83735 ASSAY OF MAGNESIUM: CPT | Performed by: EMERGENCY MEDICINE

## 2024-04-01 PROCEDURE — 96374 THER/PROPH/DIAG INJ IV PUSH: CPT

## 2024-04-01 PROCEDURE — 36415 COLL VENOUS BLD VENIPUNCTURE: CPT | Performed by: EMERGENCY MEDICINE

## 2024-04-01 PROCEDURE — 80053 COMPREHEN METABOLIC PANEL: CPT | Performed by: EMERGENCY MEDICINE

## 2024-04-01 PROCEDURE — 2550000001 HC RX 255 CONTRASTS: Performed by: EMERGENCY MEDICINE

## 2024-04-01 PROCEDURE — 2500000005 HC RX 250 GENERAL PHARMACY W/O HCPCS: Performed by: EMERGENCY MEDICINE

## 2024-04-01 PROCEDURE — 84484 ASSAY OF TROPONIN QUANT: CPT | Performed by: EMERGENCY MEDICINE

## 2024-04-01 PROCEDURE — 2500000004 HC RX 250 GENERAL PHARMACY W/ HCPCS (ALT 636 FOR OP/ED)

## 2024-04-01 PROCEDURE — 84702 CHORIONIC GONADOTROPIN TEST: CPT | Performed by: EMERGENCY MEDICINE

## 2024-04-01 PROCEDURE — 93005 ELECTROCARDIOGRAM TRACING: CPT

## 2024-04-01 PROCEDURE — 85025 COMPLETE CBC W/AUTO DIFF WBC: CPT | Performed by: EMERGENCY MEDICINE

## 2024-04-01 PROCEDURE — 71275 CT ANGIOGRAPHY CHEST: CPT

## 2024-04-01 PROCEDURE — 99285 EMERGENCY DEPT VISIT HI MDM: CPT | Mod: 25

## 2024-04-01 PROCEDURE — 71275 CT ANGIOGRAPHY CHEST: CPT | Mod: FOREIGN READ | Performed by: RADIOLOGY

## 2024-04-01 RX ORDER — LORAZEPAM 2 MG/ML
0.5 INJECTION INTRAMUSCULAR ONCE
Status: COMPLETED | OUTPATIENT
Start: 2024-04-01 | End: 2024-04-01

## 2024-04-01 RX ORDER — METOPROLOL SUCCINATE 50 MG/1
50 TABLET, EXTENDED RELEASE ORAL DAILY
COMMUNITY

## 2024-04-01 RX ORDER — LORAZEPAM 2 MG/ML
INJECTION INTRAMUSCULAR
Status: COMPLETED
Start: 2024-04-01 | End: 2024-04-01

## 2024-04-01 RX ADMIN — LORAZEPAM 0.5 MG: 2 INJECTION INTRAMUSCULAR at 05:20

## 2024-04-01 RX ADMIN — Medication: at 06:00

## 2024-04-01 RX ADMIN — IOHEXOL 50 ML: 350 INJECTION, SOLUTION INTRAVENOUS at 06:25

## 2024-04-01 RX ADMIN — LORAZEPAM 0.5 MG: 2 INJECTION INTRAMUSCULAR; INTRAVENOUS at 05:20

## 2024-04-01 ASSESSMENT — PAIN DESCRIPTION - DESCRIPTORS: DESCRIPTORS: ACHING;CRAMPING

## 2024-04-01 ASSESSMENT — HEART SCORE
RISK FACTORS: 1-2 RISK FACTORS
TROPONIN: LESS THAN OR EQUAL TO NORMAL LIMIT
HEART SCORE: 1
ECG: NORMAL
HISTORY: SLIGHTLY SUSPICIOUS
AGE: <45

## 2024-04-01 ASSESSMENT — PAIN DESCRIPTION - LOCATION: LOCATION: CHEST

## 2024-04-01 ASSESSMENT — PAIN - FUNCTIONAL ASSESSMENT: PAIN_FUNCTIONAL_ASSESSMENT: 0-10

## 2024-04-01 ASSESSMENT — PAIN DESCRIPTION - PAIN TYPE: TYPE: ACUTE PAIN

## 2024-04-01 ASSESSMENT — PAIN SCALES - GENERAL: PAINLEVEL_OUTOF10: 4

## 2024-04-01 NOTE — ED NOTES
Patient presents via [facility] with complaints of chest pain 4/10 central chest. No radiation. No SOB. Patient also has complaints of a headache     Nick Philippe RN  04/01/24 0583

## 2024-04-01 NOTE — DISCHARGE INSTRUCTIONS
Drink plenty of fluids.  Try to stop smoking.    Tylenol, heat as needed for pain.    Return to the emergency department for fever, vomiting, faintness, coughing up blood, rash.

## 2024-04-01 NOTE — Clinical Note
Analy Dumont was seen and treated in our emergency department on 4/1/2024.  She may return to work on 04/04/2024.  Or as per Dr Ro     If you have any questions or concerns, please don't hesitate to call.      Eri Torres MD

## 2024-04-02 LAB
ATRIAL RATE: 56 BPM
ATRIAL RATE: 59 BPM
P AXIS: 37 DEGREES
P AXIS: 38 DEGREES
P OFFSET: 213 MS
P OFFSET: 216 MS
P ONSET: 154 MS
P ONSET: 160 MS
PR INTERVAL: 128 MS
PR INTERVAL: 136 MS
Q ONSET: 222 MS
Q ONSET: 224 MS
QRS COUNT: 10 BEATS
QRS COUNT: 9 BEATS
QRS DURATION: 100 MS
QRS DURATION: 102 MS
QT INTERVAL: 410 MS
QT INTERVAL: 452 MS
QTC CALCULATION(BAZETT): 405 MS
QTC CALCULATION(BAZETT): 436 MS
QTC FREDERICIA: 407 MS
QTC FREDERICIA: 442 MS
R AXIS: 0 DEGREES
R AXIS: 5 DEGREES
T AXIS: 12 DEGREES
T AXIS: 2 DEGREES
T OFFSET: 429 MS
T OFFSET: 448 MS
VENTRICULAR RATE: 56 BPM
VENTRICULAR RATE: 59 BPM

## 2025-01-18 ENCOUNTER — HOSPITAL ENCOUNTER (EMERGENCY)
Facility: HOSPITAL | Age: 35
Discharge: HOME | End: 2025-01-18
Attending: EMERGENCY MEDICINE

## 2025-01-18 ENCOUNTER — APPOINTMENT (OUTPATIENT)
Dept: RADIOLOGY | Facility: HOSPITAL | Age: 35
End: 2025-01-18

## 2025-01-18 VITALS
SYSTOLIC BLOOD PRESSURE: 129 MMHG | HEIGHT: 62 IN | DIASTOLIC BLOOD PRESSURE: 70 MMHG | RESPIRATION RATE: 17 BRPM | BODY MASS INDEX: 53.18 KG/M2 | TEMPERATURE: 98.2 F | WEIGHT: 289 LBS | OXYGEN SATURATION: 93 % | HEART RATE: 90 BPM

## 2025-01-18 DIAGNOSIS — J01.10 ACUTE NON-RECURRENT FRONTAL SINUSITIS: Primary | ICD-10-CM

## 2025-01-18 DIAGNOSIS — U07.1 COVID: ICD-10-CM

## 2025-01-18 DIAGNOSIS — S83.91XA SPRAIN OF RIGHT KNEE, INITIAL ENCOUNTER: ICD-10-CM

## 2025-01-18 LAB
FLUAV RNA RESP QL NAA+PROBE: NOT DETECTED
FLUBV RNA RESP QL NAA+PROBE: NOT DETECTED
SARS-COV-2 RNA RESP QL NAA+PROBE: DETECTED

## 2025-01-18 PROCEDURE — 73564 X-RAY EXAM KNEE 4 OR MORE: CPT | Mod: RT

## 2025-01-18 PROCEDURE — 73630 X-RAY EXAM OF FOOT: CPT | Mod: RT

## 2025-01-18 PROCEDURE — 73564 X-RAY EXAM KNEE 4 OR MORE: CPT | Mod: RIGHT SIDE | Performed by: RADIOLOGY

## 2025-01-18 PROCEDURE — 87636 SARSCOV2 & INF A&B AMP PRB: CPT | Performed by: EMERGENCY MEDICINE

## 2025-01-18 PROCEDURE — 73630 X-RAY EXAM OF FOOT: CPT | Mod: RIGHT SIDE | Performed by: RADIOLOGY

## 2025-01-18 PROCEDURE — 99284 EMERGENCY DEPT VISIT MOD MDM: CPT | Performed by: EMERGENCY MEDICINE

## 2025-01-18 RX ORDER — AZITHROMYCIN 250 MG/1
250 TABLET, FILM COATED ORAL DAILY
Qty: 6 TABLET | Refills: 0 | Status: SHIPPED | OUTPATIENT
Start: 2025-01-18

## 2025-01-18 ASSESSMENT — PAIN DESCRIPTION - DESCRIPTORS: DESCRIPTORS: SHARP

## 2025-01-18 ASSESSMENT — PAIN DESCRIPTION - LOCATION: LOCATION: KNEE

## 2025-01-18 ASSESSMENT — PAIN DESCRIPTION - ORIENTATION: ORIENTATION: RIGHT

## 2025-01-18 ASSESSMENT — PAIN DESCRIPTION - PAIN TYPE: TYPE: ACUTE PAIN

## 2025-01-18 ASSESSMENT — PAIN - FUNCTIONAL ASSESSMENT: PAIN_FUNCTIONAL_ASSESSMENT: 0-10

## 2025-01-18 ASSESSMENT — PAIN SCALES - GENERAL: PAINLEVEL_OUTOF10: 6

## 2025-01-18 NOTE — Clinical Note
Analy Dumont was seen and treated in our emergency department on 1/18/2025.  She may return to work on 01/27/2025.  Off work due to knee injury  and Covid     If you have any questions or concerns, please don't hesitate to call.      Adrien Leyva MD

## 2025-01-18 NOTE — ED PROVIDER NOTES
HPI   Chief Complaint   Patient presents with   • Knee Pain     Rt knee pain for 1 1/2 weeks. No injury. Just getting worse   • URI     Chest congestion, cough, nasal drainage 2-3 days       Chief complaint the patient states that she has near knee pain right knee for the past 2 days or so  She also complains of upper respiratory symptoms  History of present illness the patient states when she woke up couple of days ago she had sudden onset of pain in the right knee the knee is diffusely painful she does not know if it swollen she has had prior surgery to the right knee in 2022 with ACL tear and she was operated on by Dr. Kee.  The patient states that she has not had any recent injury to the right knee that she is aware of.  She cannot tell if the right knee is actually swollen.  But is very painful for her to limp around.  The patient has been limping around on the right knee which is now caused her right foot distally and dorsally to become painful.  The patient states that she has taken nothing for pain at home for the right knee pain.  Patient also states that she has had prior left knee surgery per Dr. Kee.  She denies any ankle pain, no calf pain no tibial pain no hip pain.  She did not fall and has no other complaints referable to orthopedics injuries.  The patient also states that 4 to 5 days ago she developed sinus area pressure she has been blowing out yellow and green material from her nose she feels congested in the sinuses she has had a cough with some grayish sputum no fever no chest pain no shortness of breath no abdominal pain no vomiting no diarrhea.  She does not think she has been exposed to COVID or flu.  She does smoke cigarettes one quarter of a pack a day and have advised her to stop smoking cigarettes.      physical exam:    General: Vitals noted, no distress. Afebrile. Alert and oriented  x 4 .  Pupils equal and reactive bilaterally    EENT: TMs clear. Posterior oropharynx unremarkable.  No meningismus. No LAD.   Tender maxillary and frontal sinuses nasal congestion noted    Cardiac: Regular, rate, rhythm, no murmurs rubs or gallops.     Pulmonary: Lungs clear bilaterally with good aeration. No adventitious breath sounds. No wheezes rales or rhonchi.     Abdomen: Soft, nonsurgical. Nontender. No peritoneal signs. Normoactive bowel sounds. No pulsatile masses.     Extremities:   Right knee appears to be chronically swollen and similar in size to the left knee perhaps the right knee is slightly more swollen.  The patient is able to nearly fully flex the right knee spontaneously however she has pain with the effort and she is almost nearly able to fully extend the right knee with effort and pain.  There is no erythema to the right knee.  The patient has diffuse tenderness anteriorly and laterally.  No tenderness to the tibia no tenderness to the calf no tenderness to the right ankle.  The right foot is tender dorsally along the distal metatarsals but no swelling no redness.  There is no tenderness to the plantar foot no tenderness to the toes and the neurovascular exam to the foot is normal.  No peripheral edema. Negative Homans bilaterally, no cords.    Skin: No rash. Intact.     Neuro: No focal neurologic deficits, NIH score of 0. Cranial nerves normal as tested from II through XII.                   Patient History   Past Medical History:   Diagnosis Date   • Abnormal findings on diagnostic imaging of other parts of digestive tract 04/13/2016    Abnormal CT scan, gastrointestinal tract   • Bipolar disorder, unspecified (Multi)     Bipolar 1 disorder, depressed   • Calculus of gallbladder with chronic cholecystitis without obstruction 02/09/2016    Calculus of gallbladder with chronic cholecystitis without obstruction   • Chronic obstructive pulmonary disease, unspecified     Chronic bronchiolitis   • Hemorrhage of anus and rectum 04/12/2016    Rectal bleed   • Personal history of other diseases of  the respiratory system     History of asthma   • Personal history of other mental and behavioral disorders     History of attention deficit hyperactivity disorder (ADHD)   • Unspecified injury of left lower leg, initial encounter     Injury of knee, left     Past Surgical History:   Procedure Laterality Date   •  SECTION, LOW TRANSVERSE     • CHOLECYSTECTOMY  2016    Cholecystectomy Laparoscopic   • KNEE SURGERY  2016    Knee Surgery   • OTHER SURGICAL HISTORY  2016    Colonoscopy (Fiberoptic) Forceps Biopsy     No family history on file.  Social History     Tobacco Use   • Smoking status: Every Day     Current packs/day: 0.50     Types: Cigarettes   • Smokeless tobacco: Never   Substance Use Topics   • Alcohol use: Not on file   • Drug use: Never       Physical Exam   ED Triage Vitals [25 1145]   Temperature Heart Rate Respirations BP   36.8 °C (98.2 °F) 93 20 133/81      SpO2 Temp src Heart Rate Source Patient Position   97 % -- -- --      BP Location FiO2 (%)     -- --       Physical Exam      ED Course & [facility]   ED Course as of 25 1358   Sat 2025   1242  patient is COVID-positive flu negative. [AG]   1242 There is no definite acute fracture, dislocation, or joint effusion.  Surgical changes associated with prior anterior cruciate ligament  repair are noted   [AG]   1242   X-ray reveals no acute knee [AG]   1243  right knee immobilizer applied by nurse [AG]   1254  this patient has internal derangement of the right knee and will be placed on crutches and a knee brace.  I will refer her to orthopedics.  The patient also knows she needs to follow-up with Dr. Pérez her primary care physician to follow-up for her COVID symptoms.  She also has acute sinusitis [AG]      ED Course User Index  [AG] Adrien Leyva MD         Diagnoses as of 25 1358   Sprain of right knee, initial encounter   COVID   Acute non-recurrent frontal sinusitis                 No data  recorded     Marilia Coma Scale Score: 15 (01/18/25 1148 : Cherri Archibald RN)                           Medical Decision Making      Procedure  Procedures     Adrien Leyva MD  01/18/25 0560

## 2025-01-18 NOTE — DISCHARGE INSTRUCTIONS
isolate yourself from others at home due to COVID.  Wear a mask when around others.  Do not share drinks with anyone.  Return to the ER if any symptoms change or worsen.  Follow-up with your primary Dr. Rubio in a few days.  We will refer you to an orthopedic specialist for your right knee injury.  I am concerned he may have a cartilage injury or meniscus injury to your right knee.  Use crutches.  We will place you in a knee immobilizer.  Off work     elevate your knee as much as possible.   Tylenol or ibuprofen as needed    Orthopedist:  call Dr Hamm or Dr. Chowdhury    [phone]   call Tuesday AM to make appointment for your knee

## 2025-03-21 ENCOUNTER — APPOINTMENT (OUTPATIENT)
Dept: RADIOLOGY | Facility: HOSPITAL | Age: 35
End: 2025-03-21

## 2025-03-21 ENCOUNTER — HOSPITAL ENCOUNTER (EMERGENCY)
Facility: HOSPITAL | Age: 35
Discharge: HOME | End: 2025-03-21
Attending: EMERGENCY MEDICINE

## 2025-03-21 VITALS
TEMPERATURE: 97.5 F | WEIGHT: 293 LBS | OXYGEN SATURATION: 96 % | HEART RATE: 74 BPM | RESPIRATION RATE: 18 BRPM | BODY MASS INDEX: 53.92 KG/M2 | HEIGHT: 62 IN | SYSTOLIC BLOOD PRESSURE: 165 MMHG | DIASTOLIC BLOOD PRESSURE: 99 MMHG

## 2025-03-21 DIAGNOSIS — J10.1 INFLUENZA A: Primary | ICD-10-CM

## 2025-03-21 LAB
FLUAV RNA RESP QL NAA+PROBE: DETECTED
FLUBV RNA RESP QL NAA+PROBE: NOT DETECTED
SARS-COV-2 RNA RESP QL NAA+PROBE: NOT DETECTED

## 2025-03-21 PROCEDURE — 71046 X-RAY EXAM CHEST 2 VIEWS: CPT | Performed by: RADIOLOGY

## 2025-03-21 PROCEDURE — 71046 X-RAY EXAM CHEST 2 VIEWS: CPT

## 2025-03-21 PROCEDURE — 99284 EMERGENCY DEPT VISIT MOD MDM: CPT | Mod: 25 | Performed by: EMERGENCY MEDICINE

## 2025-03-21 PROCEDURE — 87636 SARSCOV2 & INF A&B AMP PRB: CPT | Performed by: EMERGENCY MEDICINE

## 2025-03-21 RX ORDER — OSELTAMIVIR PHOSPHATE 75 MG/1
75 CAPSULE ORAL EVERY 12 HOURS
Qty: 10 CAPSULE | Refills: 0 | Status: SHIPPED | OUTPATIENT
Start: 2025-03-21 | End: 2025-03-26

## 2025-03-21 ASSESSMENT — PAIN - FUNCTIONAL ASSESSMENT
PAIN_FUNCTIONAL_ASSESSMENT: 0-10
PAIN_FUNCTIONAL_ASSESSMENT: 0-10

## 2025-03-21 ASSESSMENT — PAIN DESCRIPTION - PROGRESSION: CLINICAL_PROGRESSION: NOT CHANGED

## 2025-03-21 ASSESSMENT — PAIN SCALES - GENERAL
PAINLEVEL_OUTOF10: 0 - NO PAIN
PAINLEVEL_OUTOF10: 0 - NO PAIN

## 2025-03-21 NOTE — Clinical Note
Analy Dumont was seen and treated in our emergency department on 3/21/2025.  She may return to work on 03/24/2025.       If you have any questions or concerns, please don't hesitate to call.      Paola Elliott, DO

## 2025-03-21 NOTE — ED PROVIDER NOTES
HPI   Chief Complaint   Patient presents with    URI     Pt reports cold symptoms for 2 days       Patient presents emergency department with a chief complaint of coughing and congestion for the past several days.  Patient has only taken Tylenol.  She has not taken any cough or cold medication including Mucinex.  Upon initial evaluation she is sitting eating potato chips from the vending machine.  Patient is in absolutely no acute distress upon arrival.      ROS:    CONSTITUTIONAL: Denies weight loss, fever and chills    HEENT: Sinus congestion    RESPIRATORY: Coughing with sinus congestion    CV: Denies palpitations or chest pain    GI: Denies abdominal pain, nausea, vomiting and diarrhea    : Denies dysuria and urinary frequency    MUSCULOSKELETAL: Denies myalgia and joint pain    SKIN: Denies rash and pruritus    NEUROLOGICAL: Denies headache and syncope    PSYCHIATRIC: Denies recent changes in mood. Denies anxiety and depression      PHYSICAL EXAM:    GENERAL: Alert and oriented x 3. No acute distress. Well-nourished    EYES: EOMI. Anicteric    HEENT: Moist mucous membranes. No scleral icterus. No cervical lymphadenopathy    LUNGS: Clear to auscultation bilaterally. No accessory muscle use    CARDIOVASCULAR: Regular rate and rhythm. No murmur. No JVD    ABDOMEN: Soft, non-tender and non-distended. No palpable masses    EXTREMITIES: No edema. Non-tender    SKIN: No rashes or lesions    NEUROLOGIC: No focal neurological deficits. CN II-XII grossly intact    PSYCHIATRIC: Cooperative. Appropriate mood and affect      Medical Decision Making:    Differential Diagnosis: Influenza, bronchitis, pneumonia, COVID    Clinical Laboratory Review: Reviewed all labs revealing positive influenza    Imaging Review: Chest x-ray normal    Discussion with Consulting Physician:    Treatment Plan: Discharge to home with Tamiflu    Follow Up:  Follow up with your primary care physician as soon as possible    Return Precautions:   Return to the emergency department if symptoms worsen at any time                    Patient History   Past Medical History:   Diagnosis Date    Abnormal findings on diagnostic imaging of other parts of digestive tract 2016    Abnormal CT scan, gastrointestinal tract    Bipolar disorder, unspecified (Multi)     Bipolar 1 disorder, depressed    Calculus of gallbladder with chronic cholecystitis without obstruction 2016    Calculus of gallbladder with chronic cholecystitis without obstruction    Chronic obstructive pulmonary disease, unspecified     Chronic bronchiolitis    Hemorrhage of anus and rectum 2016    Rectal bleed    Personal history of other diseases of the respiratory system     History of asthma    Personal history of other mental and behavioral disorders     History of attention deficit hyperactivity disorder (ADHD)    Unspecified injury of left lower leg, initial encounter     Injury of knee, left     Past Surgical History:   Procedure Laterality Date     SECTION, LOW TRANSVERSE      CHOLECYSTECTOMY  2016    Cholecystectomy Laparoscopic    KNEE SURGERY  2016    Knee Surgery    OTHER SURGICAL HISTORY  2016    Colonoscopy (Fiberoptic) Forceps Biopsy     No family history on file.  Social History     Tobacco Use    Smoking status: Every Day     Current packs/day: 0.50     Types: Cigarettes    Smokeless tobacco: Never   Substance Use Topics    Alcohol use: Not on file    Drug use: Never       Physical Exam   ED Triage Vitals [25 1122]   Temperature Heart Rate Respirations BP   36.4 °C (97.5 °F) 74 18 (!) 165/99      SpO2 Temp src Heart Rate Source Patient Position   96 % -- -- --      BP Location FiO2 (%)     -- --       Physical Exam      ED Course & [facility]   Diagnoses as of 25 1226   Influenza A                 No data recorded                                 Medical Decision Making      Procedure  Procedures    Diagnoses as of 25 1228   Influenza  A          Paola Elliott,   03/21/25 1229